# Patient Record
Sex: MALE | Race: WHITE | NOT HISPANIC OR LATINO
[De-identification: names, ages, dates, MRNs, and addresses within clinical notes are randomized per-mention and may not be internally consistent; named-entity substitution may affect disease eponyms.]

---

## 2017-04-10 ENCOUNTER — APPOINTMENT (OUTPATIENT)
Dept: PULMONOLOGY | Facility: CLINIC | Age: 27
End: 2017-04-10

## 2017-04-28 ENCOUNTER — APPOINTMENT (OUTPATIENT)
Dept: PULMONOLOGY | Facility: CLINIC | Age: 27
End: 2017-04-28

## 2017-04-28 ENCOUNTER — LABORATORY RESULT (OUTPATIENT)
Age: 27
End: 2017-04-28

## 2017-04-28 VITALS
TEMPERATURE: 98.1 F | RESPIRATION RATE: 19 BRPM | DIASTOLIC BLOOD PRESSURE: 70 MMHG | HEART RATE: 82 BPM | BODY MASS INDEX: 39.48 KG/M2 | SYSTOLIC BLOOD PRESSURE: 130 MMHG | WEIGHT: 282 LBS | HEIGHT: 71 IN | OXYGEN SATURATION: 97 %

## 2017-04-28 DIAGNOSIS — R06.2 WHEEZING: ICD-10-CM

## 2017-04-28 DIAGNOSIS — Z83.3 FAMILY HISTORY OF DIABETES MELLITUS: ICD-10-CM

## 2017-04-28 DIAGNOSIS — G47.10 HYPERSOMNIA, UNSPECIFIED: ICD-10-CM

## 2017-04-28 DIAGNOSIS — Z87.898 PERSONAL HISTORY OF OTHER SPECIFIED CONDITIONS: ICD-10-CM

## 2017-04-28 DIAGNOSIS — R53.83 OTHER FATIGUE: ICD-10-CM

## 2017-04-28 DIAGNOSIS — J45.990 EXERCISE INDUCED BRONCHOSPASM: ICD-10-CM

## 2017-04-28 DIAGNOSIS — Z86.59 PERSONAL HISTORY OF OTHER MENTAL AND BEHAVIORAL DISORDERS: ICD-10-CM

## 2017-04-28 DIAGNOSIS — J33.9 NASAL POLYP, UNSPECIFIED: ICD-10-CM

## 2017-04-28 DIAGNOSIS — J45.909 UNSPECIFIED ASTHMA, UNCOMPLICATED: ICD-10-CM

## 2017-04-28 DIAGNOSIS — R09.81 NASAL CONGESTION: ICD-10-CM

## 2017-04-28 DIAGNOSIS — F41.9 ANXIETY DISORDER, UNSPECIFIED: ICD-10-CM

## 2017-04-28 RX ORDER — HYDROXYZINE HYDROCHLORIDE 50 MG/1
50 TABLET ORAL
Refills: 0 | Status: ACTIVE | COMMUNITY

## 2017-04-28 RX ORDER — PAROXETINE HYDROCHLORIDE 30 MG/1
30 TABLET, FILM COATED ORAL DAILY
Refills: 0 | Status: ACTIVE | COMMUNITY

## 2017-04-28 RX ORDER — CLONAZEPAM 2 MG/1
TABLET ORAL
Refills: 0 | Status: ACTIVE | COMMUNITY

## 2017-04-28 RX ORDER — FLUTICASONE PROPIONATE 50 MCG
50 SPRAY, SUSPENSION NASAL
Refills: 0 | Status: ACTIVE | COMMUNITY

## 2017-05-02 ENCOUNTER — APPOINTMENT (OUTPATIENT)
Dept: PULMONOLOGY | Facility: CLINIC | Age: 27
End: 2017-05-02

## 2017-05-03 LAB
A ALTERNATA IGE QN: <0.1 KUA/L
A FUMIGATUS IGE QN: <0.1 KUA/L
BASOPHILS # BLD AUTO: 0.01 K/UL
BASOPHILS NFR BLD AUTO: 0.1 %
BERMUDA GRASS IGE QN: <0.1 KUA/L
BOXELDER IGE QN: 0.97 KUA/L
C HERBARUM IGE QN: <0.1 KUA/L
CALIF WALNUT IGE QN: <0.1 KUA/L
CAT DANDER IGE QN: <0.1 KUA/L
CMN PIGWEED IGE QN: <0.1 KUA/L
COMMON RAGWEED IGE QN: <0.1 KUA/L
COTTONWOOD IGE QN: <0.1 KUA/L
D FARINAE IGE QN: 46.8 KUA/L
D PTERONYSS IGE QN: 18.8 KUA/L
DEPRECATED A ALTERNATA IGE RAST QL: 0
DEPRECATED A FUMIGATUS IGE RAST QL: 0
DEPRECATED BERMUDA GRASS IGE RAST QL: 0
DEPRECATED BOXELDER IGE RAST QL: ABNORMAL
DEPRECATED C HERBARUM IGE RAST QL: 0
DEPRECATED CAT DANDER IGE RAST QL: 0
DEPRECATED COMMON PIGWEED IGE RAST QL: 0
DEPRECATED COMMON RAGWEED IGE RAST QL: 0
DEPRECATED COTTONWOOD IGE RAST QL: 0
DEPRECATED D FARINAE IGE RAST QL: ABNORMAL
DEPRECATED D PTERONYSS IGE RAST QL: ABNORMAL
DEPRECATED DOG DANDER IGE RAST QL: 0
DEPRECATED GOOSEFOOT IGE RAST QL: 0
DEPRECATED LONDON PLANE IGE RAST QL: 0
DEPRECATED MUGWORT IGE RAST QL: 0
DEPRECATED P NOTATUM IGE RAST QL: 0
DEPRECATED RED CEDAR IGE RAST QL: 0
DEPRECATED ROACH IGE RAST QL: 0
DEPRECATED SHEEP SORREL IGE RAST QL: 0
DEPRECATED SILVER BIRCH IGE RAST QL: 0
DEPRECATED TIMOTHY IGE RAST QL: 0
DEPRECATED WHITE ASH IGE RAST QL: 0
DEPRECATED WHITE OAK IGE RAST QL: 0
DOG DANDER IGE QN: <0.1 KUA/L
EOSINOPHIL # BLD AUTO: 0.3 K/UL
EOSINOPHIL NFR BLD AUTO: 4.3 %
GOOSEFOOT IGE QN: <0.1 KUA/L
HCT VFR BLD CALC: 44.5 %
HGB BLD-MCNC: 15.3 G/DL
IMM GRANULOCYTES NFR BLD AUTO: 0.3 %
LONDON PLANE IGE QN: <0.1 KUA/L
LYMPHOCYTES # BLD AUTO: 2.64 K/UL
LYMPHOCYTES NFR BLD AUTO: 37.8 %
MAN DIFF?: NORMAL
MCHC RBC-ENTMCNC: 30.2 PG
MCHC RBC-ENTMCNC: 34.4 GM/DL
MCV RBC AUTO: 87.9 FL
MONOCYTES # BLD AUTO: 0.84 K/UL
MONOCYTES NFR BLD AUTO: 12 %
MUGWORT IGE QN: <0.1 KUA/L
MULBERRY (T70) CLASS: 0
MULBERRY (T70) CONC: <0.1 KUA/L
NEUTROPHILS # BLD AUTO: 3.17 K/UL
NEUTROPHILS NFR BLD AUTO: 45.5 %
P NOTATUM IGE QN: <0.1 KUA/L
PLATELET # BLD AUTO: 238 K/UL
RBC # BLD: 5.06 M/UL
RBC # FLD: 13.8 %
RED CEDAR IGE QN: <0.1 KUA/L
ROACH IGE QN: <0.1 KUA/L
SHEEP SORREL IGE QN: <0.1 KUA/L
SILVER BIRCH IGE QN: <0.1 KUA/L
TIMOTHY IGE QN: <0.1 KUA/L
TOTAL IGE SMQN RAST: 249 KU/L
TREE ALLERG MIX1 IGE QL: 0
WBC # FLD AUTO: 6.98 K/UL
WHITE ASH IGE QN: <0.1 KUA/L
WHITE ELM IGE QN: 0
WHITE ELM IGE QN: <0.1 KUA/L
WHITE OAK IGE QN: <0.1 KUA/L

## 2017-05-10 ENCOUNTER — OUTPATIENT (OUTPATIENT)
Dept: OUTPATIENT SERVICES | Facility: HOSPITAL | Age: 27
LOS: 1 days | End: 2017-05-10
Payer: COMMERCIAL

## 2017-05-10 ENCOUNTER — APPOINTMENT (OUTPATIENT)
Dept: SLEEP CENTER | Facility: CLINIC | Age: 27
End: 2017-05-10

## 2017-05-10 PROCEDURE — 95810 POLYSOM 6/> YRS 4/> PARAM: CPT

## 2017-05-11 ENCOUNTER — APPOINTMENT (OUTPATIENT)
Dept: PULMONOLOGY | Facility: CLINIC | Age: 27
End: 2017-05-11

## 2017-05-11 DIAGNOSIS — G47.33 OBSTRUCTIVE SLEEP APNEA (ADULT) (PEDIATRIC): ICD-10-CM

## 2017-05-22 ENCOUNTER — APPOINTMENT (OUTPATIENT)
Dept: PULMONOLOGY | Facility: CLINIC | Age: 27
End: 2017-05-22

## 2017-05-23 ENCOUNTER — RESULT REVIEW (OUTPATIENT)
Age: 27
End: 2017-05-23

## 2017-06-08 RX ORDER — ALBUTEROL SULFATE 108 UG/1
108 (90 BASE) AEROSOL, METERED RESPIRATORY (INHALATION)
Qty: 1 | Refills: 2 | Status: ACTIVE | COMMUNITY
Start: 2017-06-08 | End: 1900-01-01

## 2017-06-21 ENCOUNTER — OUTPATIENT (OUTPATIENT)
Dept: OUTPATIENT SERVICES | Facility: HOSPITAL | Age: 27
LOS: 1 days | End: 2017-06-21
Payer: COMMERCIAL

## 2017-06-21 ENCOUNTER — APPOINTMENT (OUTPATIENT)
Dept: SLEEP CENTER | Facility: CLINIC | Age: 27
End: 2017-06-21

## 2017-06-21 PROCEDURE — G0400: CPT

## 2017-06-27 DIAGNOSIS — G47.33 OBSTRUCTIVE SLEEP APNEA (ADULT) (PEDIATRIC): ICD-10-CM

## 2017-06-28 ENCOUNTER — RESULT REVIEW (OUTPATIENT)
Age: 27
End: 2017-06-28

## 2018-10-29 ENCOUNTER — APPOINTMENT (OUTPATIENT)
Dept: PULMONOLOGY | Facility: CLINIC | Age: 28
End: 2018-10-29
Payer: COMMERCIAL

## 2018-10-29 VITALS
WEIGHT: 298 LBS | BODY MASS INDEX: 41.72 KG/M2 | HEIGHT: 71 IN | DIASTOLIC BLOOD PRESSURE: 79 MMHG | RESPIRATION RATE: 16 BRPM | SYSTOLIC BLOOD PRESSURE: 125 MMHG | HEART RATE: 83 BPM | TEMPERATURE: 98.4 F

## 2018-10-29 PROCEDURE — 99214 OFFICE O/P EST MOD 30 MIN: CPT

## 2018-11-02 RX ORDER — ALFUZOSIN HYDROCHLORIDE 10 MG/1
TABLET, EXTENDED RELEASE ORAL
Refills: 0 | Status: ACTIVE | COMMUNITY

## 2018-12-02 ENCOUNTER — OUTPATIENT (OUTPATIENT)
Dept: OUTPATIENT SERVICES | Facility: HOSPITAL | Age: 28
LOS: 1 days | End: 2018-12-02
Payer: COMMERCIAL

## 2018-12-02 ENCOUNTER — APPOINTMENT (OUTPATIENT)
Dept: SLEEP CENTER | Facility: CLINIC | Age: 28
End: 2018-12-02
Payer: COMMERCIAL

## 2018-12-02 PROCEDURE — 95810 POLYSOM 6/> YRS 4/> PARAM: CPT

## 2018-12-02 PROCEDURE — 95810 POLYSOM 6/> YRS 4/> PARAM: CPT | Mod: 26

## 2018-12-03 DIAGNOSIS — G47.33 OBSTRUCTIVE SLEEP APNEA (ADULT) (PEDIATRIC): ICD-10-CM

## 2019-01-23 ENCOUNTER — APPOINTMENT (OUTPATIENT)
Dept: NUTRITION | Facility: CLINIC | Age: 29
End: 2019-01-23
Payer: COMMERCIAL

## 2019-01-23 VITALS — HEIGHT: 70 IN | WEIGHT: 283 LBS | BODY MASS INDEX: 40.52 KG/M2

## 2019-01-23 PROCEDURE — 97802 MEDICAL NUTRITION INDIV IN: CPT

## 2019-03-04 ENCOUNTER — OUTPATIENT (OUTPATIENT)
Dept: OUTPATIENT SERVICES | Facility: HOSPITAL | Age: 29
LOS: 1 days | End: 2019-03-04
Payer: COMMERCIAL

## 2019-03-04 ENCOUNTER — APPOINTMENT (OUTPATIENT)
Dept: SLEEP CENTER | Facility: CLINIC | Age: 29
End: 2019-03-04
Payer: COMMERCIAL

## 2019-03-04 PROCEDURE — G0400: CPT | Mod: 26

## 2019-03-04 PROCEDURE — G0400: CPT

## 2019-03-11 DIAGNOSIS — G47.33 OBSTRUCTIVE SLEEP APNEA (ADULT) (PEDIATRIC): ICD-10-CM

## 2019-06-03 ENCOUNTER — APPOINTMENT (OUTPATIENT)
Dept: PULMONOLOGY | Facility: CLINIC | Age: 29
End: 2019-06-03
Payer: COMMERCIAL

## 2019-06-03 VITALS
BODY MASS INDEX: 37.02 KG/M2 | TEMPERATURE: 97.1 F | SYSTOLIC BLOOD PRESSURE: 117 MMHG | OXYGEN SATURATION: 98 % | RESPIRATION RATE: 16 BRPM | HEART RATE: 76 BPM | WEIGHT: 258 LBS | DIASTOLIC BLOOD PRESSURE: 79 MMHG

## 2019-06-03 PROCEDURE — 99214 OFFICE O/P EST MOD 30 MIN: CPT

## 2019-06-03 RX ORDER — ZIPRASIDONE 20 MG/1
CAPSULE ORAL
Refills: 0 | Status: DISCONTINUED | COMMUNITY
End: 2019-06-03

## 2019-06-03 RX ORDER — MODAFINIL 100 MG/1
100 TABLET ORAL
Refills: 0 | Status: DISCONTINUED | COMMUNITY
End: 2019-06-03

## 2019-06-19 NOTE — HISTORY OF PRESENT ILLNESS
[ESS: ___] : ESS score [unfilled] [AHI: ___ per hour] : Apnea-hypopnea index:  [unfilled] per hour [T90%: ___] : T90%: [unfilled]% [CPAP: ___ cmH2O] : CPAP: [unfilled] cmH2O [Date: ___] : the most recent therapeutic polysomnogram was completed [unfilled] [Nocturnal Oxygen] : The patient does not use nocturnal oxygen [FreeTextEntry1] : This is a 28 year old male with Obesity Class II, Depression, Anxiety, Bipolar D/O, and history of nasal polyps 10 years ago presenting for a follow-up for moderate obstructive sleep apnea (AHI 22).\par \par Mr. Terry was diagnosed with moderate MARCUS in 2017 and due to difficulty tolerating the PAP titration, he was initially treated with oral mandibular advancement device. However, OMAD did not adequately treat his sleep apnea--confirmed with PSG.  Therefore, he underwent a repeat titration study which identified an optimal CPAP pressure of 8 cmH20 and began therapy. He presents today stating that he has not been using his CPAP because it is "uncomfortable" and wishes to return the device. He has been using the OMAD nightly instead and is followed by a dietician for weight management. He reports a 20 lb weight loss within 6-months through diet modification and is motivated to continue with weight loss. Patient reports chronic daytime fatigue and morning dry mouth but denies other MARCUS- related symptoms. The patient does not drive and does not know if he snores. He receives 9-hours of uninterrupted sleep per night.\par \par Modafinil has been discontinued by his psychiatrist, Dr. Ray since he lost weight as it was keeping him up at night. As a result, he naps on his days off (3 x a week) in the afternoon for 3- hours which is reportedly refreshing with his OMAD. He denies unintentional sleep episodes throughout the day.\par \par Patient is also taking Paxil, hydroxyzine, and Klonopin, all taken in the morning to control his mood. Geodon has been discontinued by his psychiatrist.

## 2019-06-19 NOTE — REVIEW OF SYSTEMS
[Obesity] : obesity [Depression] : depression [Anxious] : anxious [Negative] : Musculoskeletal [Fatigue] : fatigue [A.M. Dry Mouth] : a.m. dry mouth [Awakes With Dry Mouth] : awakes with dry mouth [Recent Wt Loss (___ Lbs)] : recent [unfilled] ~Ulb weight loss [Nasal Congestion] : no nasal congestion [Postnasal Drip] : no postnasal drip [Chest Pain] : no chest pain [Shortness Of Breath] : no shortness of breath [Palpitations] : no palpitations [Edema] : ~T edema was not present [CHF] : no congestive heart failure [Thyroid Disease] : no thyroid disease [Diabetes] : no diabetes  [Frequent Nocturnal Awakenings] : no nocturnal awakenings from sleep [Witnessed Apneas] : demonstrated no ~M apnea [Awakes Unrefreshed] : restorative sleep [Unintentional Sleep while active] : no unintentional sleep while active [Unintentional Sleep while inactive] : no unintentional sleep while inactive [Awakes With Headache] : awakes without a headache [Lower Extremity Discomfort] : no lower extremity discomfort [Difficulty Initiating Sleep] : no difficulty falling asleep [Difficulty Maintaining Sleep] : no difficulty maintaining sleep [Unusual Sleep Behavior] : no unusual sleep behavior [LE discomfort relieved by movement] : lower extremity discomfort not relieved by movement [Irresistible urge to move legs] : no irresistible urge to move legs because of lower extremity discomfort [Cataplexy] :  no cataplexy [Hypnogogic Hallucinations] : no hypnogogic hallucinations [Hypnopompic Hallucinations] : no hypnopompic hallucinations [Sleep Paralysis] : no sleep paralysis [FreeTextEntry1] : 12 am  [FreeTextEntry3] : 5 minutes [FreeTextEntry2] : 8:45- 9:30 am  [de-identified] : followed by psychiatrist once every three weeks  [FreeTextEntry4] : none [FreeTextEntry6] : 8-9 hours  [FreeTextEntry7] : naps 3 x a week in the afternoons (around 12 or 1 pm when he does not have work) for 4 hours, with OMAD, which is reportedly refreshing.

## 2019-06-19 NOTE — PHYSICAL EXAM
[Normal Appearance] : normal appearance [General Appearance - In No Acute Distress] : no acute distress [Normal Conjunctiva] : the conjunctiva exhibited no abnormalities [Low Lying Soft Palate] : low lying soft palate [Enlarged Base of the Tongue] : enlargement of the base of the tongue [Elongated Uvula] : elongated uvula [IV] : IV [Neck Appearance] : the appearance of the neck was normal [Heart Rate And Rhythm] : heart rate was normal and rhythm regular [Murmurs] : no murmurs [Heart Sounds] : normal S1 and S2 [] : no respiratory distress [Respiration, Rhythm And Depth] : normal respiratory rhythm and effort [Exaggerated Use Of Accessory Muscles For Inspiration] : no accessory muscle use [Auscultation Breath Sounds / Voice Sounds] : lungs were clear to auscultation bilaterally [Involuntary Movements] : no involuntary movements were seen [Cyanosis, Localized] : no localized cyanosis [Nail Clubbing] : no clubbing of the fingernails [No Focal Deficits] : no focal deficits [Oriented To Time, Place, And Person] : oriented to person, place, and time [FreeTextEntry2] : no edema noted  [FreeTextEntry1] : demonstrated some frustration and impatience during office visit, was hesitant to answer questions, and commented on how he prefers to be around people that are of the same background and [employer] as him. Also kept repeating my name. He was offered a different provider, but declined. He was able to be re-directed. He is followed by psychiatry and is on medications.

## 2019-06-19 NOTE — ASSESSMENT
[FreeTextEntry1] : 28 year old male with comorbid Obesity, Depression, Anxiety, and Bipolar D/O, presents for a follow up for moderate obstructive sleep apnea. He was initially treated with oral mandibular advancement dental appliance but it did not adequately treat his sleep disordered breathing. Therefore, he attempted CPAP therapy but reports it was "uncomfortable" and discontinued therapy. He wants to return his CPAP device. The patient has been using OMAD nightly instead and is followed by a dietician for weight management. He has lost 20 lbs within 6 months. He reports chronic daytime fatigue and morning dry mouth but denies unintentional sleep episodes throughout the day, and other MARCUS -related symptoms. He sleeps 9-hours per night.  Patient is on three medications that can contribute to daytime somnolence: Klonopin, Paxil, and Hydroxyzine, all taken in the morning to help control his mood.  \par \par - The patient no longer wishes to be treated with CPAP therapy. Therefore, he was advised to continue with his OMAD nightly since it is reducing his apneas and providing some benefit. He was advised to follow up with his dentist, Dr. Crum, as recommended. \par \par -He was encouraged to continue with weight loss through dietary counseling and modification.  Once he reaches his goal, he was advised that he requires a repeat a diagnostic psg to determine severity of apneas and if he still warrants treatment.  I explained the relationship between obesity and obstructive sleep apnea and the role of weight loss in improving severity of MARCUS.   \par \par - 3 medications taken, some that are taken twice a day, to control his mood - Klonopin, Paxil, and Hydroxyzine can contribute to daytime fatigue. \par \par The rationale for treatment of MARCUS -- to improve quality of life, daytime function and to decrease the cardiometabolic and other medical risks that are associated with untreated MARCUS were explained to the patient.  \par He will notify us when he reaches his goal weight to re-test for severity of apneas.

## 2019-06-24 ENCOUNTER — APPOINTMENT (OUTPATIENT)
Dept: CHRONIC DISEASE MANAGEMENT | Facility: CLINIC | Age: 29
End: 2019-06-24
Payer: COMMERCIAL

## 2019-06-24 PROCEDURE — 97802 MEDICAL NUTRITION INDIV IN: CPT

## 2019-06-26 VITALS — BODY MASS INDEX: 36.94 KG/M2 | WEIGHT: 258 LBS | HEIGHT: 70 IN

## 2019-08-13 ENCOUNTER — APPOINTMENT (OUTPATIENT)
Dept: CHRONIC DISEASE MANAGEMENT | Facility: CLINIC | Age: 29
End: 2019-08-13
Payer: COMMERCIAL

## 2019-08-13 VITALS — HEIGHT: 70 IN | WEIGHT: 235 LBS | BODY MASS INDEX: 33.64 KG/M2

## 2019-08-13 PROCEDURE — 97803 MED NUTRITION INDIV SUBSEQ: CPT

## 2019-10-29 ENCOUNTER — APPOINTMENT (OUTPATIENT)
Dept: CHRONIC DISEASE MANAGEMENT | Facility: CLINIC | Age: 29
End: 2019-10-29
Payer: COMMERCIAL

## 2019-10-29 VITALS — HEIGHT: 70 IN | WEIGHT: 261 LBS | BODY MASS INDEX: 37.37 KG/M2

## 2019-10-29 PROCEDURE — 97803 MED NUTRITION INDIV SUBSEQ: CPT

## 2020-01-14 ENCOUNTER — APPOINTMENT (OUTPATIENT)
Dept: CHRONIC DISEASE MANAGEMENT | Facility: CLINIC | Age: 30
End: 2020-01-14
Payer: COMMERCIAL

## 2020-01-14 VITALS — BODY MASS INDEX: 40.52 KG/M2 | WEIGHT: 283 LBS | HEIGHT: 70 IN

## 2020-01-14 PROCEDURE — 97803 MED NUTRITION INDIV SUBSEQ: CPT

## 2020-03-30 ENCOUNTER — APPOINTMENT (OUTPATIENT)
Dept: CHRONIC DISEASE MANAGEMENT | Facility: CLINIC | Age: 30
End: 2020-03-30

## 2022-07-14 ENCOUNTER — APPOINTMENT (OUTPATIENT)
Dept: PULMONOLOGY | Facility: CLINIC | Age: 32
End: 2022-07-14

## 2022-07-14 VITALS
SYSTOLIC BLOOD PRESSURE: 141 MMHG | HEIGHT: 70.5 IN | BODY MASS INDEX: 38.93 KG/M2 | HEART RATE: 79 BPM | OXYGEN SATURATION: 96 % | TEMPERATURE: 98.3 F | WEIGHT: 275 LBS | DIASTOLIC BLOOD PRESSURE: 86 MMHG

## 2022-07-14 PROCEDURE — 99203 OFFICE O/P NEW LOW 30 MIN: CPT

## 2022-07-16 NOTE — PHYSICAL EXAM
[General Appearance - Well Developed] : well developed [Normal Appearance] : normal appearance [Well Groomed] : well groomed [General Appearance - Well Nourished] : well nourished [No Deformities] : no deformities [General Appearance - In No Acute Distress] : no acute distress [Normal Conjunctiva] : the conjunctiva exhibited no abnormalities [Eyelids - No Xanthelasma] : the eyelids demonstrated no xanthelasmas [Low Lying Soft Palate] : low lying soft palate [Enlarged Base of the Tongue] : enlargement of the base of the tongue [III] : III [Neck Appearance] : the appearance of the neck was normal [Heart Rate And Rhythm] : heart rate was normal and rhythm regular [Heart Sounds] : normal S1 and S2 [] : no respiratory distress [Respiration, Rhythm And Depth] : normal respiratory rhythm and effort [Exaggerated Use Of Accessory Muscles For Inspiration] : no accessory muscle use [Auscultation Breath Sounds / Voice Sounds] : lungs were clear to auscultation bilaterally [Abnormal Walk] : normal gait [Motor Tone] : muscle strength and tone were normal [Nail Clubbing] : no clubbing of the fingernails [Cyanosis, Localized] : no localized cyanosis [Skin Color & Pigmentation] : normal skin color and pigmentation [Skin Lesions] : no skin lesions [Cranial Nerves] : cranial nerves 2-12 were intact [Motor Exam] : the motor exam was normal [No Focal Deficits] : no focal deficits [Oriented To Time, Place, And Person] : oriented to person, place, and time [Impaired Insight] : insight and judgment were intact [Affect] : the affect was normal [Mood] : the mood was normal

## 2022-07-16 NOTE — HISTORY OF PRESENT ILLNESS
[FreeTextEntry1] : 30 yo M presents for initial evaluation of sleep disordered breathing\par Referred by Dr. Crum.\par PMH: moderate MARCUS, obesity, bipolar disorder, nasal polyps\par Meds: Paxil 20 mg daily, Klonopin, Alfuzosin, Lithium, Olanzapine\par \par Sleep history: \par DIagnosed with moderate MARCUS in 2017, had difficulty tolerating PAP therapy and was referred for OMAD, however did not adequately treat his MARCUS and he returned for APAP titration and a pressure of 8 cmH2O was found to be adequate to improve but not normalize his SDB.\par Last seen in our office in 2019, was not using CPAP at that time as it was uncomfortable and returned to using OMAD along with weight loss. \par \par Last PST 12/2/2018: mild MARCUS AHI 11.8/hr, moderate in REM sleep, T<90 of 5.3%\par \par Received a new oral appliance from Dr. Wilber Crum and has been using for a few months, feels he is deriving benefit and returns for efficacy testing.  \par ESS 0 while using oral appliance. \par \par When he doesn’t use the oral appliance, main complaints of nonrestorative sleep, severe snoring and daytime somnolence.\par \par Quality Metrics:\par Smoking history: never\par ESS: 0\par \par Vaccines: \par COVID: Pfizer x3\par Influenza: never received  - not interested\par Pneumococcal: NA\par \par

## 2022-07-16 NOTE — REVIEW OF SYSTEMS
[Obesity] : obesity [Difficulty Initiating Sleep] : no difficulty falling asleep [Difficulty Maintaining Sleep] : no difficulty maintaining sleep [Lower Extremity Discomfort] : no lower extremity discomfort [Unusual Sleep Behavior] : no unusual sleep behavior [Hypersomnolence] : not sleeping much more than usual [Negative] : Musculoskeletal [FreeTextEntry3] : per hpi [FreeTextEntry8] : per hpi [de-identified] : bipolar disorder

## 2022-07-16 NOTE — ASSESSMENT
[FreeTextEntry1] : 32 yo M with mild- moderate MARCUS, symptomatic, using OAT who presents for initial evaluation and for efficacy testing for his oral appliance.\par Plan:\par Will send the patient for a diagnostic PSG with the oral appliance in place at the [facility] sleep disorders center (patient prefers in lab study as he has done this in  the past) . Explained the rationale for diagnosis and treatment of sleep apnea including its effect on quality of life and long term cardiovascular risk. \par \par Will call for test results 1 week after completion. \par

## 2022-10-21 ENCOUNTER — APPOINTMENT (OUTPATIENT)
Dept: BARIATRICS/WEIGHT MGMT | Facility: CLINIC | Age: 32
End: 2022-10-21

## 2022-10-21 VITALS — BODY MASS INDEX: 39.64 KG/M2 | HEIGHT: 70.5 IN | WEIGHT: 280 LBS

## 2022-10-21 DIAGNOSIS — R40.0 SOMNOLENCE: ICD-10-CM

## 2022-10-21 DIAGNOSIS — Z86.69 PERSONAL HISTORY OF OTHER DISEASES OF THE NERVOUS SYSTEM AND SENSE ORGANS: ICD-10-CM

## 2022-10-21 DIAGNOSIS — Z86.39 PERSONAL HISTORY OF OTHER ENDOCRINE, NUTRITIONAL AND METABOLIC DISEASE: ICD-10-CM

## 2022-10-21 DIAGNOSIS — R06.02 SHORTNESS OF BREATH: ICD-10-CM

## 2022-10-21 DIAGNOSIS — Z87.898 PERSONAL HISTORY OF OTHER SPECIFIED CONDITIONS: ICD-10-CM

## 2022-10-21 PROCEDURE — 99205 OFFICE O/P NEW HI 60 MIN: CPT | Mod: 95

## 2022-10-23 PROBLEM — Z87.898 HISTORY OF SNORING: Status: RESOLVED | Noted: 2017-04-28 | Resolved: 2022-10-23

## 2022-10-23 PROBLEM — Z86.39 HISTORY OF OBESITY: Status: RESOLVED | Noted: 2017-04-28 | Resolved: 2022-10-23

## 2022-10-23 PROBLEM — R40.0 DAYTIME SLEEPINESS: Status: ACTIVE | Noted: 2017-04-28

## 2022-10-23 PROBLEM — R06.02 SHORTNESS OF BREATH ON EXERTION: Status: ACTIVE | Noted: 2017-04-28

## 2022-10-23 PROBLEM — Z86.69 HISTORY OF OBSTRUCTIVE SLEEP APNEA: Status: RESOLVED | Noted: 2017-04-28 | Resolved: 2022-10-23

## 2022-10-23 NOTE — HISTORY OF PRESENT ILLNESS
[FreeTextEntry1] : Bariatric surgery history: none. \par Obesity co-morbidities: MARCUS with dental appliance, bipolar/schizophrenia\par Comorbidities improved or resolved: none\par Anti-obesity medications: none\par Obesity medication side effects: none\par \par PATIENT WAS NOTIFIED THAT ANYTHING WE DISCUSSED IN OUR MEETING TODAY MAY BE REFLECTED IN WRITING IN THE VISIT NOTE WHICH WILL BE AVAILABLE TO OTHER MEDICAL PROVIDERS TO REVIEW AS PART OF ROUTINE PATIENT CARE.  PATIENT VERBALLY AGREED. \par \par Mr. HARRIET AUGUST is a 32 year year old male who presents for evaluation and treatment of Class 3 obesity. \par \par Obesity related co-morbidities: MARCUS with dental appliance, bipolar/schizophrenia\par Been on Paxil for about 6 years, and Lithium - about 1.5 years.  Just started seeing a new psychiatrist - Dr. Diaz. Mood is better these days. \par \par Patient lives - mother and father, 2 brothers\par Employment status - not employed. previously worked as \par \par Got labs recently. \par \par Weight History:\par Lowest adult weight: don't remember\par Highest adult weight: 280\par \par Has gained 2-5 pounds over the past year.\par \par Not interested in medications.  "Absolutely not."  \par \par Obesity began in adulthood.  Weight gain has occurred with: gradual increase since college.  When he was working as a  - regained weight because of eating out, shwarma, fried foods - 40 lbs gained in 6 months.   quit early 2020.   \par \par Past weight loss attempts include: dietitian, walking his mother and brother.  These have produced a maximum of 30-40 pounds of weight loss.  \par Anti-obesity medications in the past: no\par \par Reasons for desiring weight loss: health\par Perceived obstacles to losing weight: portions\par \par Sleep: 10 hours.  with dental appliance, has noticed improvement\par \par Has 2 regular meals a day.  breakfast and dinner. \par \par Diet history:\par wakes up at:\par B: 10am - 8 eggs - fried, 1/2 bag of grapes\par L:  skips, plate of grapes 1/2 bag or so\par D: 6- 730 pm (today is sabbath) 2 slices of bread, little salad 3 pieces of chicken, bowl of soup, 1-2 pieces of cake\par Regular night - 1/2 big bowl of salad with hummus mixed in, or pasta, lasagna - 2 cards worth \par late night - 1/2 bag of grapes around 930pm. \par \par snacks: afternoon, late night - grapes\par eating after dinner: yes most nights \par overeating episodes: no.  \par no alcohol.  \par \par Sodas/fast food/processed foods: pizza on Thursday - 2 slices with salad with hummus. \par Likes all soups - ex. sweet potato and carrot \par \par Water intake per day: 2L seltzer of water in AM, and then throughout the day another 1.5 L\par coffee 40 oz per day in morning - 3 tablespoons of milk\par \par Physical activity:\par Patient enjoys: walking - 4 days a week, 2.5 hours - will restart \par Current physical activity: a few hours a day\par \par Habits patient would like to change: unsure \par Level of interest in losing weight: 5/5 \par Community support: 5/5 \par \par Factors that have helped in the past with losing weight and keeping it off: none\par \par

## 2022-10-23 NOTE — ASSESSMENT
[FreeTextEntry1] : 32 y.o M with Class 2 obesity, MARCUS on dental appliance, bipolar d/o, binge eating habits, presents for weight management\par \par - unable to review most recent labs\par - discussed OA, or [facility] - [facility] is strict on meal quality and quantity\par - large portions - more than double portions, would be considered binge eating, patient does not report feelings of discomfort after eating\par - restarts walking 2-3 hours a day Nov 6th with family - encouraged\par - consider psych appt - can discuss in future\par - declines any medications of any sort to treat obesity\par - Mother was also on call today\par - discussed portions need to decrease, and seltzer water 2L at one time is expanding his stomach, does not want to change this\par - discussed having 3 smaller meals a day, with no snacks\par - would be open to talking to NP, RDN\par \par f/u 4-6 weeks

## 2022-10-23 NOTE — REASON FOR VISIT
[Home] : at home, [unfilled] , at the time of the visit. [Medical Office: ([facility])___] : at the medical office located in  [Initial Evaluation] : an initial evaluation

## 2022-11-03 ENCOUNTER — APPOINTMENT (OUTPATIENT)
Dept: BARIATRICS/WEIGHT MGMT | Facility: CLINIC | Age: 32
End: 2022-11-03

## 2022-11-09 ENCOUNTER — APPOINTMENT (OUTPATIENT)
Dept: BARIATRICS/WEIGHT MGMT | Facility: CLINIC | Age: 32
End: 2022-11-09

## 2022-11-16 ENCOUNTER — APPOINTMENT (OUTPATIENT)
Dept: BARIATRICS/WEIGHT MGMT | Facility: CLINIC | Age: 32
End: 2022-11-16

## 2022-11-16 VITALS — WEIGHT: 271 LBS | BODY MASS INDEX: 38.34 KG/M2

## 2022-11-16 PROCEDURE — 99203 OFFICE O/P NEW LOW 30 MIN: CPT | Mod: 95

## 2022-11-21 NOTE — HISTORY OF PRESENT ILLNESS
[Home] : at home, [unfilled] , at the time of the visit. [Other Location: e.g. Home (Enter Location, City,State)___] : at [unfilled] [Verbal consent obtained from patient] : the patient, [unfilled] [FreeTextEntry1] : Patient presents for weight loss and overweight/obese comorbidity management\par \par Vicky is following up with me after meeting with Dr. Go\par family wanted the care under one physician as all are making changes together\par Vicky has lost 5 lbs since meeting with Dr. 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

## 2022-11-21 NOTE — ASSESSMENT
[FreeTextEntry1] : The following plan was agreed upon in discussion with this patient. This plan addresses this patient's overweight / obesity as well as the following medical comorbidities of overweight / obesity: insulin resistance\par \par -introductory resources were provided\par -counseled to follow changes with family\par -new recipe / cooking resources provided\par -encouraged continue with daily walking\par -discussed addition of metformin to counteract olanzapine, and they are considering\par \par Bariatric surgery history: none\par Overweight / obesity comorbidities: insulin resistance\par Current anti-obesity medications: none\par Obesity medication side effects: n/a\par \par

## 2022-12-05 RX ORDER — METFORMIN ER 500 MG 500 MG/1
500 TABLET ORAL
Qty: 30 | Refills: 5 | Status: DISCONTINUED | COMMUNITY
Start: 2022-12-01 | End: 2022-12-05

## 2022-12-08 RX ORDER — METFORMIN HYDROCHLORIDE 500 MG/1
500 TABLET, COATED ORAL
Qty: 90 | Refills: 1 | Status: DISCONTINUED | COMMUNITY
Start: 2022-12-05 | End: 2022-12-08

## 2022-12-08 RX ORDER — METFORMIN ER 500 MG 500 MG/1
500 TABLET ORAL
Qty: 30 | Refills: 5 | Status: ACTIVE | COMMUNITY
Start: 2022-12-08 | End: 1900-01-01

## 2022-12-14 ENCOUNTER — APPOINTMENT (OUTPATIENT)
Dept: BARIATRICS/WEIGHT MGMT | Facility: CLINIC | Age: 32
End: 2022-12-14

## 2022-12-14 VITALS — BODY MASS INDEX: 37.49 KG/M2 | WEIGHT: 265 LBS

## 2022-12-14 PROCEDURE — 99214 OFFICE O/P EST MOD 30 MIN: CPT | Mod: 95

## 2022-12-15 NOTE — HISTORY OF PRESENT ILLNESS
[Home] : at home, [unfilled] , at the time of the visit. [Other Location: e.g. Home (Enter Location, City,State)___] : at [unfilled] [Verbal consent obtained from patient] : the patient, [unfilled] [FreeTextEntry1] : Patient presents for weight loss and overweight/obese comorbidity management\par \par Nachum is doing well, eating new foods losing weight\par wants to add exercise to the plan, has started some walking\par still eating diet high in AP, but lower in fat generally less processed\par has many questions about different foods and weight loss\par metformin xr not as strong as metformin ir\par but no diarrhea\par some processed foods still in diet - breakfast cereal\par wants to have more effect from metformin\par \par Due to comorbidities this patient requires medical treatment for overweight / obesity\par

## 2022-12-15 NOTE — ASSESSMENT
[FreeTextEntry1] : The following plan was agreed upon in discussion with this patient. This plan addresses this patient's overweight / obesity as well as the following medical comorbidities of overweight / obesity: insulin resistance\par \par -increase metformin ER to 1000 mg daily\par -counseled on nutrition\par -encouraged exercise; new resources provided\par -new food and recipe guides provided\par -whole food breakfast granola recipe provided\par \par Bariatric surgery history: none\par Overweight / obesity comorbidities: insulin resistance\par Current anti-obesity medications: metformin\par Obesity medication side effects: diarrhea\par \par

## 2022-12-27 ENCOUNTER — APPOINTMENT (OUTPATIENT)
Dept: SLEEP CENTER | Facility: CLINIC | Age: 32
End: 2022-12-27

## 2022-12-27 ENCOUNTER — OUTPATIENT (OUTPATIENT)
Dept: OUTPATIENT SERVICES | Facility: HOSPITAL | Age: 32
LOS: 1 days | End: 2022-12-27
Payer: COMMERCIAL

## 2022-12-27 PROCEDURE — 95810 POLYSOM 6/> YRS 4/> PARAM: CPT | Mod: 26

## 2022-12-27 PROCEDURE — 95810 POLYSOM 6/> YRS 4/> PARAM: CPT

## 2023-01-06 ENCOUNTER — APPOINTMENT (OUTPATIENT)
Dept: PULMONOLOGY | Facility: CLINIC | Age: 33
End: 2023-01-06
Payer: COMMERCIAL

## 2023-01-06 PROCEDURE — 99442: CPT

## 2023-01-19 ENCOUNTER — APPOINTMENT (OUTPATIENT)
Dept: BARIATRICS/WEIGHT MGMT | Facility: CLINIC | Age: 33
End: 2023-01-19
Payer: COMMERCIAL

## 2023-01-19 PROCEDURE — 99214 OFFICE O/P EST MOD 30 MIN: CPT | Mod: 95

## 2023-01-25 NOTE — HISTORY OF PRESENT ILLNESS
[Home] : at home, [unfilled] , at the time of the visit. [Other Location: e.g. Home (Enter Location, City,State)___] : at [unfilled] [Verbal consent obtained from patient] : the patient, [unfilled] [FreeTextEntry1] : Patient presents for weight loss and overweight/obese comorbidity management\par \par Nachum is weight stable\par is walking for activity\par has not made substantial changes to diet\par is ambivalent about that\par but willing to keep learning about nutrition\par metformin no longer having an effect on appetite\par \par Due to comorbidities this patient requires medical treatment for overweight / obesity\par

## 2023-01-25 NOTE — ASSESSMENT
[FreeTextEntry1] : The following plan was agreed upon in discussion with this patient. This plan addresses this patient's overweight / obesity as well as the following medical comorbidities of overweight / obesity: hyperlipidemia insulin resistance obstructive sleep apnea\par \par -dietary counseling provided\par -increase metformin to 1000 mg daily\par -new educational and motivational resources provided\par -continue monthly follow ups\par \par Bariatric surgery history: none\par Overweight / obesity comorbidities:hyperlipidemia insulin resistance\par Current anti-obesity medications: metformin\par Obesity medication side effects:  none\par

## 2023-02-02 DIAGNOSIS — G47.33 OBSTRUCTIVE SLEEP APNEA (ADULT) (PEDIATRIC): ICD-10-CM

## 2023-02-16 ENCOUNTER — APPOINTMENT (OUTPATIENT)
Dept: BARIATRICS/WEIGHT MGMT | Facility: CLINIC | Age: 33
End: 2023-02-16
Payer: COMMERCIAL

## 2023-02-16 VITALS — BODY MASS INDEX: 36.64 KG/M2 | WEIGHT: 259 LBS

## 2023-02-16 PROCEDURE — 99214 OFFICE O/P EST MOD 30 MIN: CPT | Mod: 95

## 2023-02-17 NOTE — ASSESSMENT
[FreeTextEntry1] : The following plan was agreed upon in discussion with this patient. This plan addresses this patient's overweight / obesity as well as the following medical comorbidities of overweight / obesity: insulin resistance seema\par \par -questions answered\par -encouraged continued efforts, supportive counseling provided\par -new resources provided\par -continue monthly follow ups\par \par Bariatric surgery history: none\par Overweight / obesity comorbidities: insulin resistance seema\par Current anti-obesity medications: none\par Obesity medication side effects: n/a\par \par

## 2023-02-17 NOTE — HISTORY OF PRESENT ILLNESS
[Home] : at home, [unfilled] , at the time of the visit. [Other Location: e.g. Home (Enter Location, City,State)___] : at [unfilled] [Verbal consent obtained from patient] : the patient, [unfilled] [FreeTextEntry1] : Patient presents for weight loss and overweight/obese comorbidity management\par \par doing very well \par losing weight again\par eating oatmeal\par happy with changes\par enjoyed starches in stead of ABF\par V/F options expanding\par continues to walk for exercise\par \par Due to comorbidities this patient requires medical treatment for overweight / obesity\par

## 2023-03-23 ENCOUNTER — APPOINTMENT (OUTPATIENT)
Dept: BARIATRICS/WEIGHT MGMT | Facility: CLINIC | Age: 33
End: 2023-03-23
Payer: COMMERCIAL

## 2023-03-23 PROCEDURE — 99214 OFFICE O/P EST MOD 30 MIN: CPT | Mod: 95

## 2023-03-24 NOTE — ASSESSMENT
[FreeTextEntry1] : Bariatric surgery history: none\par Overweight / obesity comorbidities: insulin resitance seema\par Current anti-obesity medications: none\par Obesity medication side effects: n/a\par \par -continue efforts with dietary changes\par -continue walking for activity\par -new educational resources provided\par

## 2023-03-24 NOTE — HISTORY OF PRESENT ILLNESS
[Home] : at home, [unfilled] , at the time of the visit. [Other Location: e.g. Home (Enter Location, City,State)___] : at [unfilled] [Verbal consent obtained from patient] : the patient, [unfilled] [FreeTextEntry1] : Patient presents for weight loss and overweight/obese comorbidity management\par \par \par \par Due to comorbidities this patient requires medical treatment for overweight / obesity\par

## 2023-05-03 ENCOUNTER — APPOINTMENT (OUTPATIENT)
Dept: BARIATRICS/WEIGHT MGMT | Facility: CLINIC | Age: 33
End: 2023-05-03
Payer: COMMERCIAL

## 2023-05-03 VITALS — WEIGHT: 256 LBS | BODY MASS INDEX: 36.21 KG/M2

## 2023-05-03 PROCEDURE — 99214 OFFICE O/P EST MOD 30 MIN: CPT | Mod: 95

## 2023-05-04 NOTE — HISTORY OF PRESENT ILLNESS
[Home] : at home, [unfilled] , at the time of the visit. [Other Location: e.g. Home (Enter Location, City,State)___] : at [unfilled] [Verbal consent obtained from patient] : the patient, [unfilled] [FreeTextEntry1] : Patient presents for weight loss and overweight/obese comorbidity management\par \par doing very well\par following dietary changes with family\par continues to walk for activity\par wants to maintain the course\par has a few nutrition questions\par \par Due to comorbidities this patient requires medical treatment for overweight / obesity\par

## 2023-05-04 NOTE — ASSESSMENT
[FreeTextEntry1] : Bariatric surgery history: none\par Overweight / obesity comorbidities: insulin resistance\par Current anti-obesity medications: none\par Obesity medication side effects: n/a\par \par -continue excellent efforts at lifestyle changes\par -new resources provided\par -continue regular follow ups\par

## 2023-05-31 ENCOUNTER — APPOINTMENT (OUTPATIENT)
Dept: BARIATRICS/WEIGHT MGMT | Facility: CLINIC | Age: 33
End: 2023-05-31
Payer: COMMERCIAL

## 2023-05-31 VITALS — WEIGHT: 255 LBS | BODY MASS INDEX: 36.07 KG/M2

## 2023-05-31 PROCEDURE — 99214 OFFICE O/P EST MOD 30 MIN: CPT | Mod: 95

## 2023-05-31 NOTE — HISTORY OF PRESENT ILLNESS
[Home] : at home, [unfilled] , at the time of the visit. [Other Location: e.g. Home (Enter Location, City,State)___] : at [unfilled] [Verbal consent obtained from patient] : the patient, [unfilled] [FreeTextEntry1] : Patient presents for weight loss and overweight/obese comorbidity management\par \par nachum is doing v well\par continues to lose steadily\par gardening now for activity\par continues to eat well with help of family SVF\par \par Due to comorbidities this patient requires medical treatment for overweight / obesity\par

## 2023-05-31 NOTE — ASSESSMENT
[FreeTextEntry1] : Bariatric surgery history: none\par Overweight / obesity comorbidities: seema insulin resistance\par Current anti-obesity medications: none\par Obesity medication side effects: n/a\par \par -continue excellent lifestyle changes\par -questions answered and nutrition counseling provided\par -new educational resources shared\par

## 2023-06-08 ENCOUNTER — NON-APPOINTMENT (OUTPATIENT)
Age: 33
End: 2023-06-08

## 2023-07-05 ENCOUNTER — APPOINTMENT (OUTPATIENT)
Dept: BARIATRICS/WEIGHT MGMT | Facility: CLINIC | Age: 33
End: 2023-07-05
Payer: COMMERCIAL

## 2023-07-05 VITALS — BODY MASS INDEX: 35.79 KG/M2 | WEIGHT: 253 LBS

## 2023-07-05 PROCEDURE — 99214 OFFICE O/P EST MOD 30 MIN: CPT | Mod: 95

## 2023-07-05 NOTE — HISTORY OF PRESENT ILLNESS
[Home] : at home, [unfilled] , at the time of the visit. [Other Location: e.g. Home (Enter Location, City,State)___] : at [unfilled] [Verbal consent obtained from patient] : the patient, [unfilled] [FreeTextEntry1] : Patient presents for weight loss and overweight/obese comorbidity management\par \par doing v well\par recent lab work with stunning blood glucose, LDL is below 50, normal triglycerides\par continues to walk for activity\par mom and ian has some questions about nutrition and food prep\par \par Due to comorbidities this patient requires medical treatment for overweight / obesity\par

## 2023-07-05 NOTE — ASSESSMENT
[FreeTextEntry1] : Bariatric surgery history: none\par Overweight / obesity comorbidities: insulin resistance\par Current anti-obesity medications: none\par Obesity medication side effects: n/a\par \par -continue lifestyle efforts\par -praised progress\par -counseled on nutrition\par -new educational and cooking resources provided\par

## 2023-07-25 ENCOUNTER — APPOINTMENT (OUTPATIENT)
Dept: PULMONOLOGY | Facility: CLINIC | Age: 33
End: 2023-07-25
Payer: COMMERCIAL

## 2023-07-25 VITALS
WEIGHT: 255 LBS | OXYGEN SATURATION: 97 % | BODY MASS INDEX: 36.1 KG/M2 | HEART RATE: 94 BPM | TEMPERATURE: 96.2 F | SYSTOLIC BLOOD PRESSURE: 114 MMHG | DIASTOLIC BLOOD PRESSURE: 80 MMHG | HEIGHT: 70.5 IN

## 2023-07-25 PROCEDURE — 99213 OFFICE O/P EST LOW 20 MIN: CPT

## 2023-07-25 NOTE — PHYSICAL EXAM
[General Appearance - Well Developed] : well developed [Normal Appearance] : normal appearance [Well Groomed] : well groomed [General Appearance - Well Nourished] : well nourished [No Deformities] : no deformities [General Appearance - In No Acute Distress] : no acute distress [Normal Conjunctiva] : the conjunctiva exhibited no abnormalities [Eyelids - No Xanthelasma] : the eyelids demonstrated no xanthelasmas [Low Lying Soft Palate] : low lying soft palate [Enlarged Base of the Tongue] : enlargement of the base of the tongue [III] : III [Heart Rate And Rhythm] : heart rate was normal and rhythm regular [Heart Sounds] : normal S1 and S2 [Respiration, Rhythm And Depth] : normal respiratory rhythm and effort [Exaggerated Use Of Accessory Muscles For Inspiration] : no accessory muscle use [Auscultation Breath Sounds / Voice Sounds] : lungs were clear to auscultation bilaterally [Abnormal Walk] : normal gait [Motor Tone] : muscle strength and tone were normal [Nail Clubbing] : no clubbing of the fingernails [Cyanosis, Localized] : no localized cyanosis [Skin Color & Pigmentation] : normal skin color and pigmentation [Skin Lesions] : no skin lesions [Cranial Nerves] : cranial nerves 2-12 were intact [Motor Exam] : the motor exam was normal [No Focal Deficits] : no focal deficits [Oriented To Time, Place, And Person] : oriented to person, place, and time [Impaired Insight] : insight and judgment were intact [Mood] : the mood was normal [Affect] : the affect was normal [Neck Appearance] : the appearance of the neck was normal [] : the neck was supple

## 2023-07-25 NOTE — REVIEW OF SYSTEMS
WDL [Obesity] : obesity [Negative] : Musculoskeletal [Difficulty Initiating Sleep] : no difficulty falling asleep [Difficulty Maintaining Sleep] : no difficulty maintaining sleep [Lower Extremity Discomfort] : no lower extremity discomfort [Unusual Sleep Behavior] : no unusual sleep behavior [Hypersomnolence] : not sleeping much more than usual [FreeTextEntry3] : per hpi [FreeTextEntry8] : per hpi [de-identified] : bipolar disorder

## 2023-07-25 NOTE — CONSULT LETTER
[Dear  ___] : Dear  [unfilled], [Please see my note below.] : Please see my note below. [Consult Closing:] : Thank you very much for allowing me to participate in the care of this patient.  If you have any questions, please do not hesitate to contact me. [Sincerely,] : Sincerely, [Courtesy Letter:] : I had the pleasure of seeing your patient, [unfilled], in my office today. [FreeTextEntry2] : Dr. Wilber Crum [FreeTextEntry3] : Antonette Tran MD, FAASM\par  of Medicine\par Associate , Fellowship in Pulmonary and Critical Care Medicine\par Division of Pulmonary, Critical Care & Sleep Medicine\par [employer] [facility]\par \par \par

## 2023-07-25 NOTE — ASSESSMENT
[FreeTextEntry1] : 32 yo M with mild- moderate MARCUS, symptomatic, using OAT who presents for follow up. \par \par Plan:\par Will send the patient for a diagnostic PSG at the [facility] sleep disorders center (patient prefers in lab study as he has done this in the past) . \par Explained the rationale for diagnosis and treatment of sleep apnea including its effect on quality of life and long term cardiovascular risk. \par \par Will call for test results 1 week after completion. \par

## 2023-07-25 NOTE — HISTORY OF PRESENT ILLNESS
[FreeTextEntry1] : 33 yo M with moderate MARCUS presents for follow up of sleep disordered breathing.\par \par 7/14/22- Initial evaluation of sleep disordered breathing\par Referred by Dr. Crum.\par PMH: moderate MARCUS, obesity, bipolar disorder, nasal polyps\par Meds: Paxil 20 mg daily, Klonopin, Alfuzosin, Lithium, Olanzapine\par \par Sleep history: \par DIagnosed with moderate MARCUS in 2017, had difficulty tolerating PAP therapy and was referred for OMAD, however did not adequately treat his MARCUS and he returned for APAP titration and a pressure of 8 cmH2O was found to be adequate to improve but not normalize his SDB.\par Last seen in our office in 2019, was not using CPAP at that time as it was uncomfortable and returned to using OMAD along with weight loss. \par \par Last PST 12/2/2018: mild MARCUS AHI 11.8/hr, moderate in REM sleep, T<90 of 5.3%\par \par Received a new oral appliance from Dr. Wilber Crum and has been using for a few months, feels he is deriving benefit and returns for efficacy testing.  \par ESS 0 while using oral appliance. \par \par When he doesn’t use the oral appliance, main complaints of nonrestorative sleep, severe snoring and daytime somnolence.\par \par Quality Metrics:\par Smoking history: never\par ESS: 0\par \par Vaccines: \par COVID: Pfizer x3\par Influenza: never received  - not interested\par Pneumococcal: NA\par \par Follow up OV 7/25/23:\par Diagnostic PSG with oral appliance 12/2022- normal AHI 4/hr, snoring present\par States his oral appliance side clip broke recently and has been having it repaired by Dr. Crum, but at this point is approximately 3 years old and would qualify for a new oral appliance, however, needs an updated sleep study. When not using the device, continues to snore, with nonrestorative sleep and EDS.\par \par

## 2023-08-16 ENCOUNTER — OUTPATIENT (OUTPATIENT)
Dept: OUTPATIENT SERVICES | Facility: HOSPITAL | Age: 33
LOS: 1 days | End: 2023-08-16
Payer: COMMERCIAL

## 2023-08-16 ENCOUNTER — APPOINTMENT (OUTPATIENT)
Dept: BARIATRICS/WEIGHT MGMT | Facility: CLINIC | Age: 33
End: 2023-08-16
Payer: COMMERCIAL

## 2023-08-16 DIAGNOSIS — I10 ESSENTIAL (PRIMARY) HYPERTENSION: ICD-10-CM

## 2023-08-16 PROCEDURE — 99214 OFFICE O/P EST MOD 30 MIN: CPT | Mod: 95

## 2023-08-16 PROCEDURE — G0463: CPT

## 2023-08-16 NOTE — HISTORY OF PRESENT ILLNESS
[Home] : at home, [unfilled] , at the time of the visit. [Other Location: e.g. Home (Enter Location, City,State)___] : at [unfilled] [Verbal consent obtained from patient] : the patient, [unfilled] [FreeTextEntry1] : Patient presents for weight loss and overweight/obese comorbidity management  weight stable walking 2x per week otherwise inactive high level hunger on zyprexa eating large portions of starches and pounds of grapes and watermelon daily is currently precontemplative regarding increasing activity or shifting to more vegetables  Due to comorbidities this patient requires medical treatment for overweight / obesity

## 2023-08-16 NOTE — ASSESSMENT
[FreeTextEntry1] : Bariatric surgery history: none Overweight / obesity comorbidities: seema insulin resistance Current anti-obesity medications: none Obesity medication side effects: n/a  -reiterated nutrition concepts; advised lots of vegetables over fruits -continue to discuss increasing PA -may perhaps be coming off of zyprexa -upcoming sleep evaluation for [facility]

## 2023-08-25 DIAGNOSIS — G47.33 OBSTRUCTIVE SLEEP APNEA (ADULT) (PEDIATRIC): ICD-10-CM

## 2023-08-25 DIAGNOSIS — E66.01 MORBID (SEVERE) OBESITY DUE TO EXCESS CALORIES: ICD-10-CM

## 2023-08-25 DIAGNOSIS — E88.81 METABOLIC SYNDROME AND OTHER INSULIN RESISTANCE: ICD-10-CM

## 2023-09-28 ENCOUNTER — OUTPATIENT (OUTPATIENT)
Dept: OUTPATIENT SERVICES | Facility: HOSPITAL | Age: 33
LOS: 1 days | End: 2023-09-28

## 2023-09-28 ENCOUNTER — APPOINTMENT (OUTPATIENT)
Dept: BARIATRICS/WEIGHT MGMT | Facility: CLINIC | Age: 33
End: 2023-09-28
Payer: COMMERCIAL

## 2023-09-28 VITALS — WEIGHT: 243 LBS | BODY MASS INDEX: 34.37 KG/M2

## 2023-09-28 DIAGNOSIS — I10 ESSENTIAL (PRIMARY) HYPERTENSION: ICD-10-CM

## 2023-09-28 PROCEDURE — 99214 OFFICE O/P EST MOD 30 MIN: CPT | Mod: 95

## 2023-11-01 ENCOUNTER — APPOINTMENT (OUTPATIENT)
Dept: SLEEP CENTER | Facility: CLINIC | Age: 33
End: 2023-11-01
Payer: COMMERCIAL

## 2023-11-01 ENCOUNTER — OUTPATIENT (OUTPATIENT)
Dept: OUTPATIENT SERVICES | Facility: HOSPITAL | Age: 33
LOS: 1 days | End: 2023-11-01
Payer: COMMERCIAL

## 2023-11-01 PROCEDURE — 95810 POLYSOM 6/> YRS 4/> PARAM: CPT

## 2023-11-01 PROCEDURE — 95810 POLYSOM 6/> YRS 4/> PARAM: CPT | Mod: 26

## 2023-11-07 DIAGNOSIS — G47.33 OBSTRUCTIVE SLEEP APNEA (ADULT) (PEDIATRIC): ICD-10-CM

## 2023-11-10 ENCOUNTER — APPOINTMENT (OUTPATIENT)
Dept: PULMONOLOGY | Facility: CLINIC | Age: 33
End: 2023-11-10
Payer: COMMERCIAL

## 2023-11-10 PROCEDURE — 99441: CPT

## 2023-11-15 ENCOUNTER — OUTPATIENT (OUTPATIENT)
Dept: OUTPATIENT SERVICES | Facility: HOSPITAL | Age: 33
LOS: 1 days | End: 2023-11-15
Payer: COMMERCIAL

## 2023-11-15 ENCOUNTER — APPOINTMENT (OUTPATIENT)
Dept: BARIATRICS/WEIGHT MGMT | Facility: CLINIC | Age: 33
End: 2023-11-15
Payer: COMMERCIAL

## 2023-11-15 VITALS — BODY MASS INDEX: 34.52 KG/M2 | WEIGHT: 244 LBS

## 2023-11-15 DIAGNOSIS — E66.9 OBESITY, UNSPECIFIED: ICD-10-CM

## 2023-11-15 DIAGNOSIS — G47.33 OBSTRUCTIVE SLEEP APNEA (ADULT) (PEDIATRIC): ICD-10-CM

## 2023-11-15 DIAGNOSIS — I10 ESSENTIAL (PRIMARY) HYPERTENSION: ICD-10-CM

## 2023-11-15 DIAGNOSIS — E88.819 INSULIN RESISTANCE, UNSPECIFIED: ICD-10-CM

## 2023-11-15 PROCEDURE — G0463: CPT

## 2023-11-15 PROCEDURE — 99214 OFFICE O/P EST MOD 30 MIN: CPT | Mod: 95

## 2023-12-15 ENCOUNTER — APPOINTMENT (OUTPATIENT)
Dept: BARIATRICS/WEIGHT MGMT | Facility: CLINIC | Age: 33
End: 2023-12-15
Payer: COMMERCIAL

## 2023-12-15 VITALS — WEIGHT: 241 LBS | BODY MASS INDEX: 34.09 KG/M2

## 2023-12-15 PROCEDURE — 99214 OFFICE O/P EST MOD 30 MIN: CPT | Mod: 95

## 2023-12-15 NOTE — ASSESSMENT
[FreeTextEntry1] : Bariatric surgery history: Overweight / obesity comorbidities: seema insulin resistance Current anti-obesity medications: none Obesity medication side effects: n/a  -questions answered -encouraged to continue with changes in activity -guidance around meal ideas and snacks provided -continue 4 - 6 wk follow ups

## 2023-12-15 NOTE — HISTORY OF PRESENT ILLNESS
[Home] : at home, [unfilled] , at the time of the visit. [Other Location: e.g. Home (Enter Location, City,State)___] : at [unfilled] [Verbal consent obtained from patient] : the patient, [unfilled] [FreeTextEntry1] : Patient presents for weight loss and overweight/obese comorbidity management  doing v well diet balance has shifted again less fruit more S/V meals activity similar but feeling good with changes and he and family are v motivated  Due to comorbidities this patient requires medical treatment for overweight / obesity

## 2024-01-24 ENCOUNTER — APPOINTMENT (OUTPATIENT)
Dept: BARIATRICS/WEIGHT MGMT | Facility: CLINIC | Age: 34
End: 2024-01-24
Payer: COMMERCIAL

## 2024-01-24 VITALS — BODY MASS INDEX: 33.24 KG/M2 | WEIGHT: 235 LBS

## 2024-01-24 PROCEDURE — 99214 OFFICE O/P EST MOD 30 MIN: CPT | Mod: 95

## 2024-01-24 NOTE — HISTORY OF PRESENT ILLNESS
[Home] : at home, [unfilled] , at the time of the visit. [Other Location: e.g. Home (Enter Location, City,State)___] : at [unfilled] [Verbal consent obtained from patient] : the patient, [unfilled] [FreeTextEntry1] : Patient presents for weight loss and overweight/obese comorbidity management  doing v well vegetable fiber rich diet walking more for activity 6 lb loss in last month motivated to continue the dietary and activity changes  Due to comorbidities this patient requires medical treatment for overweight / obesity

## 2024-01-24 NOTE — ASSESSMENT
[FreeTextEntry1] : Bariatric surgery history: none Overweight / obesity comorbidities: seema Current anti-obesity medications: none Obesity medication side effects: n/a  -reviewed progress, and feeback on what's been driving WL -nutrition questions answered -continue follow ups for support

## 2024-03-01 ENCOUNTER — APPOINTMENT (OUTPATIENT)
Dept: BARIATRICS/WEIGHT MGMT | Facility: CLINIC | Age: 34
End: 2024-03-01
Payer: COMMERCIAL

## 2024-03-01 PROCEDURE — 99214 OFFICE O/P EST MOD 30 MIN: CPT

## 2024-03-01 NOTE — HISTORY OF PRESENT ILLNESS
[Home] : at home, [unfilled] , at the time of the visit. [Other Location: e.g. Home (Enter Location, City,State)___] : at [unfilled] [Verbal consent obtained from patient] : the patient, [unfilled] [FreeTextEntry1] : Patient presents for weight loss and overweight/obese comorbidity management  weight stable walking regularly 3 - 4x per week diet is optimized in AM, but late calories with 2 cans corn, or large portion of grapes breakfast is v modest ~200 kcal  Due to comorbidities this patient requires medical treatment for overweight / obesity

## 2024-03-01 NOTE — ASSESSMENT
[FreeTextEntry1] : Bariatric surgery history: none Overweight / obesity comorbidities: none Current anti-obesity medications: n/a Obesity medication side effects: n/a  -reviewed meal timing, importance of larger calories in AM -move corn to lunch time -move peppers and vegetable snacks to evening -continue excellent PA, discuss more vigorous workout in future

## 2024-05-08 ENCOUNTER — APPOINTMENT (OUTPATIENT)
Dept: BARIATRICS/WEIGHT MGMT | Facility: CLINIC | Age: 34
End: 2024-05-08
Payer: COMMERCIAL

## 2024-05-08 VITALS — WEIGHT: 228 LBS | BODY MASS INDEX: 32.25 KG/M2

## 2024-05-08 DIAGNOSIS — E66.9 OBESITY, UNSPECIFIED: ICD-10-CM

## 2024-05-08 DIAGNOSIS — E88.819 INSULIN RESISTANCE, UNSPECIFIED: ICD-10-CM

## 2024-05-08 PROCEDURE — 99213 OFFICE O/P EST LOW 20 MIN: CPT

## 2024-05-08 NOTE — HISTORY OF PRESENT ILLNESS
[Home] : at home, [unfilled] , at the time of the visit. [Other Location: e.g. Home (Enter Location, City,State)___] : at [unfilled] [Verbal consent obtained from patient] : the patient, [unfilled] [FreeTextEntry1] : Patient presents for weight loss and overweight/obese comorbidity management  doing v well diet has rebalanced to more starch/vegetable pattern rather than high amounts of fruit steady progress, feeling well lost weight despite meat heavy passover meals back on track now with diet a few questions about nutrition  Due to comorbidities this patient requires medical treatment for overweight / obesity

## 2024-05-08 NOTE — ASSESSMENT
[FreeTextEntry1] : Bariatric surgery history: none Overweight / obesity comorbidities: insulin resistance Current anti-obesity medications: none Obesity medication side effects: n/a  reviewed excellent progress questions answered encouraged continued starch/vegetable template

## 2024-05-24 ENCOUNTER — APPOINTMENT (OUTPATIENT)
Dept: NEUROLOGY | Facility: CLINIC | Age: 34
End: 2024-05-24
Payer: COMMERCIAL

## 2024-05-24 VITALS
HEART RATE: 79 BPM | WEIGHT: 228 LBS | HEIGHT: 70 IN | BODY MASS INDEX: 32.64 KG/M2 | DIASTOLIC BLOOD PRESSURE: 76 MMHG | SYSTOLIC BLOOD PRESSURE: 117 MMHG

## 2024-05-24 DIAGNOSIS — F95.9 TIC DISORDER, UNSPECIFIED: ICD-10-CM

## 2024-05-24 PROCEDURE — 99203 OFFICE O/P NEW LOW 30 MIN: CPT

## 2024-05-24 NOTE — HISTORY OF PRESENT ILLNESS
[FreeTextEntry1] : 33M who is seeing me for tics  he has a hx of schizoaffective d/o and follows with psychiatry. He was functioning well up until the age of 17yrs, after which he decompensated and completed [employer] college. He flunked out of [employer].   Had vocal tics at age 17yrs. He developed jaw tics at the age of 18. It reportedly improved with addition of antidepressant. Currently he experiences head shaking stereotypy. He admits that he has premonitory urge and cannot suppress them. They last <1min. There are days when he does not have this movement.  Denies any OCD tendencies. Denies having hallucinations. Takes lithium, prozac and olanzapine for his mood disorder. Attempts to lower olanazapine triggered increase in vocal tics and the dose had to be increased to control it. He denies any changes to his overall movements or gait.   FH: 1 bother schizophrenia and 1 brother schizoaffective.   Meds paxil 30mg qd lithium 600mg  olanzapine 15mg

## 2024-05-24 NOTE — PHYSICAL EXAM
[Cranial Nerves Oculomotor (III)] : extraocular motion intact [Cranial Nerves Facial (VII)] : face symmetrical [Motor Strength] : muscle strength was normal in all four extremities [1+] : Patella left 1+ [FreeTextEntry1] : Overweight. conversant. Follows commands well. Facial masking with reduced blink rate. There is no tremor. There is mild symmetric bradykinesia with mild cogwheel rigidity in his arms. There is no ataxia. There were no observable motor or vocal tics. Gait and station were normal.

## 2024-05-24 NOTE — DISCUSSION/SUMMARY
[FreeTextEntry1] : 33M with hx of schizoaffective disorder with motor tics that are intermittent at this time. 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 Given his family history of mental health issues, i recommended a brain MRI to assess overall anatomy. In addition, intuniv can be considered in the future if psychiatry wants to try and lower or discontinue olanzapine.   f/u prn

## 2024-06-17 ENCOUNTER — APPOINTMENT (OUTPATIENT)
Dept: MRI IMAGING | Facility: CLINIC | Age: 34
End: 2024-06-17
Payer: COMMERCIAL

## 2024-06-17 ENCOUNTER — OUTPATIENT (OUTPATIENT)
Dept: OUTPATIENT SERVICES | Facility: HOSPITAL | Age: 34
LOS: 1 days | End: 2024-06-17
Payer: COMMERCIAL

## 2024-06-17 DIAGNOSIS — Z00.8 ENCOUNTER FOR OTHER GENERAL EXAMINATION: ICD-10-CM

## 2024-06-17 DIAGNOSIS — F95.9 TIC DISORDER, UNSPECIFIED: ICD-10-CM

## 2024-06-17 PROCEDURE — 70551 MRI BRAIN STEM W/O DYE: CPT | Mod: 26

## 2024-06-17 PROCEDURE — 70551 MRI BRAIN STEM W/O DYE: CPT

## 2024-06-19 ENCOUNTER — APPOINTMENT (OUTPATIENT)
Dept: BARIATRICS/WEIGHT MGMT | Facility: CLINIC | Age: 34
End: 2024-06-19
Payer: COMMERCIAL

## 2024-06-19 ENCOUNTER — OUTPATIENT (OUTPATIENT)
Dept: OUTPATIENT SERVICES | Facility: HOSPITAL | Age: 34
LOS: 1 days | End: 2024-06-19
Payer: COMMERCIAL

## 2024-06-19 DIAGNOSIS — G47.33 OBSTRUCTIVE SLEEP APNEA (ADULT) (PEDIATRIC): ICD-10-CM

## 2024-06-19 DIAGNOSIS — I10 ESSENTIAL (PRIMARY) HYPERTENSION: ICD-10-CM

## 2024-06-19 DIAGNOSIS — E66.01 MORBID (SEVERE) OBESITY DUE TO EXCESS CALORIES: ICD-10-CM

## 2024-06-19 PROCEDURE — G0463: CPT

## 2024-06-19 PROCEDURE — 99213 OFFICE O/P EST LOW 20 MIN: CPT | Mod: 95

## 2024-06-19 NOTE — HISTORY OF PRESENT ILLNESS
[Home] : at home, [unfilled] , at the time of the visit. [Other Location: e.g. Home (Enter Location, City,State)___] : at [unfilled] [Verbal consent obtained from patient] : the patient, [unfilled] [FreeTextEntry1] : Patient presents for weight loss and overweight/obese comorbidity management  continues to do well some regain over holidays now back on track and weight stable again diet is excellent, family helping walking lapsed but picking back up meeting with psychiatrist about med adjustments currently on olanzapine and lithium  Due to comorbidities this patient requires medical treatment for overweight / obesity

## 2024-06-19 NOTE — ASSESSMENT
[FreeTextEntry1] : Bariatric surgery history: none Overweight / obesity comorbidities: seema Current anti-obesity medications: none Obesity medication side effects: n/a  reviewed excellent progress nutrition questions answered encouraged following up with psychiatrist to review other med options, given WG effects of Li and olanzapine

## 2024-06-24 DIAGNOSIS — E66.01 MORBID (SEVERE) OBESITY DUE TO EXCESS CALORIES: ICD-10-CM

## 2024-06-24 DIAGNOSIS — G47.33 OBSTRUCTIVE SLEEP APNEA (ADULT) (PEDIATRIC): ICD-10-CM

## 2024-08-21 ENCOUNTER — APPOINTMENT (OUTPATIENT)
Dept: BARIATRICS/WEIGHT MGMT | Facility: CLINIC | Age: 34
End: 2024-08-21
Payer: COMMERCIAL

## 2024-08-21 VITALS — BODY MASS INDEX: 31.85 KG/M2 | WEIGHT: 222 LBS

## 2024-08-21 DIAGNOSIS — E66.9 OBESITY, UNSPECIFIED: ICD-10-CM

## 2024-08-21 DIAGNOSIS — G47.33 OBSTRUCTIVE SLEEP APNEA (ADULT) (PEDIATRIC): ICD-10-CM

## 2024-08-21 PROCEDURE — 99213 OFFICE O/P EST LOW 20 MIN: CPT | Mod: 95

## 2024-08-21 NOTE — HISTORY OF PRESENT ILLNESS
[Home] : at home, [unfilled] , at the time of the visit. [Other Location: e.g. Home (Enter Location, City,State)___] : at [unfilled] [Verbal consent obtained from patient] : the patient, [unfilled] [FreeTextEntry1] : Patient presents for weight loss and overweight/obese comorbidity management  Doing v well down on zyprexa losing weight appetite down eating smaller portions spontaneously pleased with progress

## 2024-08-21 NOTE — ASSESSMENT
[FreeTextEntry1] : Bariatric surgery history: none Overweight / obesity comorbidities: seema Current anti-obesity medications: none Obesity medication side effects: n/a  reviewed excellent efforts cont current diet

## 2024-09-25 ENCOUNTER — APPOINTMENT (OUTPATIENT)
Dept: BARIATRICS/WEIGHT MGMT | Facility: CLINIC | Age: 34
End: 2024-09-25
Payer: COMMERCIAL

## 2024-09-25 VITALS — WEIGHT: 217 LBS | BODY MASS INDEX: 31.14 KG/M2

## 2024-09-25 DIAGNOSIS — G47.33 OBSTRUCTIVE SLEEP APNEA (ADULT) (PEDIATRIC): ICD-10-CM

## 2024-09-25 DIAGNOSIS — E66.9 OBESITY, UNSPECIFIED: ICD-10-CM

## 2024-09-25 PROCEDURE — 99213 OFFICE O/P EST LOW 20 MIN: CPT

## 2024-09-25 NOTE — HISTORY OF PRESENT ILLNESS
[Home] : at home, [unfilled] , at the time of the visit. [Other Location: e.g. Home (Enter Location, City,State)___] : at [unfilled] [Verbal consent obtained from patient] : the patient, [unfilled] [FreeTextEntry1] : Patient presents for weight loss and overweight/obese comorbidity management  continues to do well losing weight steadily diet is excellent

## 2024-09-25 NOTE — ASSESSMENT
[FreeTextEntry1] : Bariatric surgery history: none Overweight / obesity comorbidities: seema Current anti-obesity medications: none Obesity medication side effects: n/a  reviewed excellent progress questions answered re: nutrition

## 2024-11-01 ENCOUNTER — APPOINTMENT (OUTPATIENT)
Dept: BARIATRICS/WEIGHT MGMT | Facility: CLINIC | Age: 34
End: 2024-11-01
Payer: MEDICAID

## 2024-11-01 DIAGNOSIS — G47.33 OBSTRUCTIVE SLEEP APNEA (ADULT) (PEDIATRIC): ICD-10-CM

## 2024-11-01 DIAGNOSIS — E66.01 MORBID (SEVERE) OBESITY DUE TO EXCESS CALORIES: ICD-10-CM

## 2024-11-01 PROCEDURE — 99213 OFFICE O/P EST LOW 20 MIN: CPT | Mod: 95

## 2025-01-03 ENCOUNTER — APPOINTMENT (OUTPATIENT)
Dept: BARIATRICS/WEIGHT MGMT | Facility: CLINIC | Age: 35
End: 2025-01-03
Payer: COMMERCIAL

## 2025-01-03 DIAGNOSIS — G47.33 OBSTRUCTIVE SLEEP APNEA (ADULT) (PEDIATRIC): ICD-10-CM

## 2025-01-03 DIAGNOSIS — E66.812 OBESITY, CLASS 2: ICD-10-CM

## 2025-01-03 PROCEDURE — 99213 OFFICE O/P EST LOW 20 MIN: CPT | Mod: 95

## 2025-01-04 PROBLEM — E66.812 OBESITY, CLASS II, BMI 35-39.9: Status: ACTIVE | Noted: 2019-06-03

## 2025-02-28 ENCOUNTER — APPOINTMENT (OUTPATIENT)
Dept: BARIATRICS/WEIGHT MGMT | Facility: CLINIC | Age: 35
End: 2025-02-28
Payer: MEDICAID

## 2025-02-28 DIAGNOSIS — E66.01 MORBID (SEVERE) OBESITY DUE TO EXCESS CALORIES: ICD-10-CM

## 2025-02-28 DIAGNOSIS — E66.812 OBESITY, CLASS 2: ICD-10-CM

## 2025-02-28 DIAGNOSIS — E88.819 INSULIN RESISTANCE, UNSPECIFIED: ICD-10-CM

## 2025-02-28 DIAGNOSIS — G47.33 OBSTRUCTIVE SLEEP APNEA (ADULT) (PEDIATRIC): ICD-10-CM

## 2025-02-28 PROCEDURE — 99213 OFFICE O/P EST LOW 20 MIN: CPT | Mod: 95

## 2025-04-02 ENCOUNTER — NON-APPOINTMENT (OUTPATIENT)
Age: 35
End: 2025-04-02

## 2025-04-18 ENCOUNTER — APPOINTMENT (OUTPATIENT)
Dept: BARIATRICS/WEIGHT MGMT | Facility: CLINIC | Age: 35
End: 2025-04-18
Payer: COMMERCIAL

## 2025-04-18 DIAGNOSIS — G47.33 OBSTRUCTIVE SLEEP APNEA (ADULT) (PEDIATRIC): ICD-10-CM

## 2025-04-18 DIAGNOSIS — E66.812 OBESITY, CLASS 2: ICD-10-CM

## 2025-04-18 PROCEDURE — 99213 OFFICE O/P EST LOW 20 MIN: CPT | Mod: 95

## 2025-05-23 ENCOUNTER — APPOINTMENT (OUTPATIENT)
Dept: BARIATRICS/WEIGHT MGMT | Facility: CLINIC | Age: 35
End: 2025-05-23
Payer: COMMERCIAL

## 2025-05-23 DIAGNOSIS — E66.812 OBESITY, CLASS 2: ICD-10-CM

## 2025-05-23 DIAGNOSIS — E88.819 INSULIN RESISTANCE, UNSPECIFIED: ICD-10-CM

## 2025-05-23 PROCEDURE — 99213 OFFICE O/P EST LOW 20 MIN: CPT | Mod: 95

## 2025-05-28 ENCOUNTER — APPOINTMENT (OUTPATIENT)
Dept: PULMONOLOGY | Facility: CLINIC | Age: 35
End: 2025-05-28

## 2025-05-28 DIAGNOSIS — G47.33 OBSTRUCTIVE SLEEP APNEA (ADULT) (PEDIATRIC): ICD-10-CM

## 2025-05-28 PROCEDURE — 99214 OFFICE O/P EST MOD 30 MIN: CPT | Mod: 95

## 2025-05-28 PROCEDURE — G2211 COMPLEX E/M VISIT ADD ON: CPT | Mod: NC,95

## 2025-06-27 ENCOUNTER — APPOINTMENT (OUTPATIENT)
Dept: BARIATRICS/WEIGHT MGMT | Facility: CLINIC | Age: 35
End: 2025-06-27
Payer: COMMERCIAL

## 2025-06-27 VITALS — BODY MASS INDEX: 31.42 KG/M2 | WEIGHT: 219 LBS

## 2025-06-27 PROCEDURE — 99213 OFFICE O/P EST LOW 20 MIN: CPT | Mod: 95

## 2025-08-01 ENCOUNTER — APPOINTMENT (OUTPATIENT)
Dept: BARIATRICS/WEIGHT MGMT | Facility: CLINIC | Age: 35
End: 2025-08-01
Payer: COMMERCIAL

## 2025-08-01 DIAGNOSIS — E66.812 OBESITY, CLASS 2: ICD-10-CM

## 2025-08-01 DIAGNOSIS — G47.33 OBSTRUCTIVE SLEEP APNEA (ADULT) (PEDIATRIC): ICD-10-CM

## 2025-08-01 PROCEDURE — 99213 OFFICE O/P EST LOW 20 MIN: CPT | Mod: 95

## 2025-08-06 ENCOUNTER — OUTPATIENT (OUTPATIENT)
Dept: OUTPATIENT SERVICES | Facility: HOSPITAL | Age: 35
LOS: 1 days | End: 2025-08-06
Payer: COMMERCIAL

## 2025-08-06 ENCOUNTER — APPOINTMENT (OUTPATIENT)
Dept: SLEEP CENTER | Facility: CLINIC | Age: 35
End: 2025-08-06

## 2025-08-06 PROCEDURE — 95810 POLYSOM 6/> YRS 4/> PARAM: CPT | Mod: 26

## 2025-08-06 PROCEDURE — 95810 POLYSOM 6/> YRS 4/> PARAM: CPT

## 2025-08-13 ENCOUNTER — APPOINTMENT (OUTPATIENT)
Dept: PULMONOLOGY | Facility: CLINIC | Age: 35
End: 2025-08-13

## 2025-08-13 ENCOUNTER — APPOINTMENT (OUTPATIENT)
Dept: PULMONOLOGY | Facility: CLINIC | Age: 35
End: 2025-08-13
Payer: COMMERCIAL

## 2025-08-13 ENCOUNTER — LABORATORY RESULT (OUTPATIENT)
Age: 35
End: 2025-08-13

## 2025-08-13 VITALS
HEART RATE: 82 BPM | DIASTOLIC BLOOD PRESSURE: 84 MMHG | BODY MASS INDEX: 31.92 KG/M2 | HEIGHT: 70 IN | OXYGEN SATURATION: 99 % | WEIGHT: 223 LBS | SYSTOLIC BLOOD PRESSURE: 133 MMHG

## 2025-08-13 DIAGNOSIS — J45.990 EXERCISE INDUCED BRONCHOSPASM: ICD-10-CM

## 2025-08-13 DIAGNOSIS — J33.9 NASAL POLYP, UNSPECIFIED: ICD-10-CM

## 2025-08-13 PROCEDURE — 36415 COLL VENOUS BLD VENIPUNCTURE: CPT

## 2025-08-13 PROCEDURE — 95012 NITRIC OXIDE EXP GAS DETER: CPT

## 2025-08-13 PROCEDURE — 94729 DIFFUSING CAPACITY: CPT

## 2025-08-13 PROCEDURE — 94726 PLETHYSMOGRAPHY LUNG VOLUMES: CPT

## 2025-08-13 PROCEDURE — ZZZZZ: CPT

## 2025-08-13 PROCEDURE — 99215 OFFICE O/P EST HI 40 MIN: CPT | Mod: 25

## 2025-08-13 PROCEDURE — 94060 EVALUATION OF WHEEZING: CPT

## 2025-08-14 LAB
BASOPHILS # BLD AUTO: 0.03 K/UL
BASOPHILS NFR BLD AUTO: 0.5 %
EOSINOPHIL # BLD AUTO: 0.1 K/UL
EOSINOPHIL NFR BLD AUTO: 1.6 %
HCT VFR BLD CALC: 47.5 %
HGB BLD-MCNC: 15.7 G/DL
IMM GRANULOCYTES NFR BLD AUTO: 0.8 %
LYMPHOCYTES # BLD AUTO: 1.45 K/UL
LYMPHOCYTES NFR BLD AUTO: 22.6 %
MAN DIFF?: NORMAL
MCHC RBC-ENTMCNC: 30.4 PG
MCHC RBC-ENTMCNC: 33.1 G/DL
MCV RBC AUTO: 91.9 FL
MONOCYTES # BLD AUTO: 0.62 K/UL
MONOCYTES NFR BLD AUTO: 9.6 %
NEUTROPHILS # BLD AUTO: 4.18 K/UL
NEUTROPHILS NFR BLD AUTO: 64.9 %
PLATELET # BLD AUTO: 283 K/UL
RBC # BLD: 5.17 M/UL
RBC # FLD: 12.9 %
WBC # FLD AUTO: 6.43 K/UL

## 2025-08-18 ENCOUNTER — NON-APPOINTMENT (OUTPATIENT)
Age: 35
End: 2025-08-18

## 2025-08-18 LAB
A ALTERNATA IGE QN: <0.1 KUA/L
A FUMIGATUS IGE QN: <0.1 KUA/L
C ALBICANS IGE QN: <0.1 KUA/L
C HERBARUM IGE QN: <0.1 KUA/L
CAT DANDER IGE QN: <0.1 KUA/L
COMMON RAGWEED IGE QN: 0.2 KUA/L
D FARINAE IGE QN: 33.8 KUA/L
D PTERONYSS IGE QN: 12.8 KUA/L
DEPRECATED A ALTERNATA IGE RAST QL: 0
DEPRECATED A FUMIGATUS IGE RAST QL: 0
DEPRECATED C ALBICANS IGE RAST QL: 0
DEPRECATED C HERBARUM IGE RAST QL: 0
DEPRECATED CAT DANDER IGE RAST QL: 0
DEPRECATED COMMON RAGWEED IGE RAST QL: NORMAL
DEPRECATED D FARINAE IGE RAST QL: 4
DEPRECATED D PTERONYSS IGE RAST QL: 3
DEPRECATED DOG DANDER IGE RAST QL: NORMAL
DEPRECATED M RACEMOSUS IGE RAST QL: 0
DEPRECATED ROACH IGE RAST QL: 0
DEPRECATED TIMOTHY IGE RAST QL: 0
DEPRECATED WHITE OAK IGE RAST QL: 0
DOG DANDER IGE QN: 0.14 KUA/L
M RACEMOSUS IGE QN: <0.1 KUA/L
ROACH IGE QN: <0.1 KUA/L
TIMOTHY IGE QN: <0.1 KUA/L
TOTAL IGE SMQN RAST: 840 KU/L
WHITE OAK IGE QN: <0.1 KUA/L

## 2025-09-04 DIAGNOSIS — G47.33 OBSTRUCTIVE SLEEP APNEA (ADULT) (PEDIATRIC): ICD-10-CM

## 2025-09-09 ENCOUNTER — APPOINTMENT (OUTPATIENT)
Dept: OTOLARYNGOLOGY | Facility: CLINIC | Age: 35
End: 2025-09-09
Payer: COMMERCIAL

## 2025-09-09 DIAGNOSIS — J33.9 NASAL POLYP, UNSPECIFIED: ICD-10-CM

## 2025-09-09 DIAGNOSIS — H61.23 IMPACTED CERUMEN, BILATERAL: ICD-10-CM

## 2025-09-09 DIAGNOSIS — J33.8 OTHER POLYP OF SINUS: ICD-10-CM

## 2025-09-09 DIAGNOSIS — R09.81 NASAL CONGESTION: ICD-10-CM

## 2025-09-09 PROCEDURE — 69210 REMOVE IMPACTED EAR WAX UNI: CPT | Mod: RT

## 2025-09-09 PROCEDURE — 31231 NASAL ENDOSCOPY DX: CPT

## 2025-09-09 PROCEDURE — 99204 OFFICE O/P NEW MOD 45 MIN: CPT | Mod: 25

## 2025-09-09 RX ORDER — OLANZAPINE 5 MG/1
5 TABLET, FILM COATED ORAL
Refills: 0 | Status: ACTIVE | COMMUNITY

## 2025-09-09 RX ORDER — PAROXETINE HYDROCHLORIDE 40 MG/1
TABLET, FILM COATED ORAL
Refills: 0 | Status: ACTIVE | COMMUNITY

## 2025-09-09 RX ORDER — FLUTICASONE FUROATE AND VILANTEROL TRIFENATATE 200; 25 UG/1; UG/1
200-25 POWDER RESPIRATORY (INHALATION)
Refills: 0 | Status: ACTIVE | COMMUNITY

## 2025-09-09 RX ORDER — METHYLPREDNISOLONE 4 MG/1
4 TABLET ORAL
Qty: 1 | Refills: 0 | Status: ACTIVE | COMMUNITY
Start: 2025-09-09 | End: 1900-01-01

## 2025-09-09 RX ORDER — LITHIUM CARBONATE 300 MG/1
300 TABLET ORAL
Refills: 0 | Status: ACTIVE | COMMUNITY

## 2025-09-09 RX ORDER — CARIPRAZINE 1.5 MG/1
1.5 CAPSULE, GELATIN COATED ORAL
Refills: 0 | Status: ACTIVE | COMMUNITY

## 2025-09-12 ENCOUNTER — APPOINTMENT (OUTPATIENT)
Dept: BARIATRICS/WEIGHT MGMT | Facility: CLINIC | Age: 35
End: 2025-09-12
Payer: MEDICAID

## 2025-09-12 ENCOUNTER — APPOINTMENT (OUTPATIENT)
Dept: PULMONOLOGY | Facility: CLINIC | Age: 35
End: 2025-09-12
Payer: COMMERCIAL

## 2025-09-12 DIAGNOSIS — E66.812 OBESITY, CLASS 2: ICD-10-CM

## 2025-09-12 DIAGNOSIS — G47.33 OBSTRUCTIVE SLEEP APNEA (ADULT) (PEDIATRIC): ICD-10-CM

## 2025-09-12 PROCEDURE — G2211 COMPLEX E/M VISIT ADD ON: CPT | Mod: NC,95

## 2025-09-12 PROCEDURE — 99213 OFFICE O/P EST LOW 20 MIN: CPT | Mod: 95

## 2025-09-15 ENCOUNTER — NON-APPOINTMENT (OUTPATIENT)
Age: 35
End: 2025-09-15